# Patient Record
(demographics unavailable — no encounter records)

---

## 2025-02-06 NOTE — HISTORY OF PRESENT ILLNESS
[FreeTextEntry1] : Last seen 2021 Here today for follow up visit.  PSA hx Per pt. PSA has been between 6 -7.0 ng/mL for many years. Most recent PSA with PCP 12/18/2024 - 7.29 ng/mL  Urinary symptoms: Good stream, nocturia 1-2 times. Denies frequency, feeling of incomplete bladder emptying, hematuria or dysuria.  MRI of the prostate 2021 No MRI targetable prostate lesions. Volume: 46cc.  Reports mother had breast cancer diagnosed in her 50's.

## 2025-02-06 NOTE — PHYSICAL EXAM
[General Appearance - In No Acute Distress] : no acute distress [Edema] : no peripheral edema [] : no respiratory distress [Normal Station and Gait] : the gait and station were normal for the patient's age [Skin Color & Pigmentation] : normal skin color and pigmentation [No Focal Deficits] : no focal deficits [Oriented To Time, Place, And Person] : oriented to person, place, and time [No Prostate Nodules] : no prostate nodules [Prostate Size ___ gm] : prostate size [unfilled] gm [FreeTextEntry2] : Tiana Phelps NP

## 2025-02-06 NOTE — ASSESSMENT
[FreeTextEntry1] : Outside lab reviewed. PSA stable for several years. Does not need repeat imaging. Prostate exam within normal limits. Follow up in one year.

## 2025-03-05 NOTE — HISTORY OF PRESENT ILLNESS
[de-identified] : 71M is s/p uncomplicated lap nerissa with me on 2/19 for acute cholecystitis.  No complaints, did not need any of the pain medications. Minimal loose stools.  Developed a rash all over his abdomen post-op, which is improving.  Exam: Op sites were removed, steris all still on. Incisions all without evidence of infection or hernia. Diffuse rash over entire abdomen that looks to be contact dermatitis. Path: chronic cholecystitis with stones; no malignancy The rash is in the distribution of the chlorohexidine prep --> discussed that he has an allergy to this prep.  Discussed core muscle use restrcition.  May follow up no PRN with me.